# Patient Record
Sex: FEMALE | Race: WHITE | Employment: UNEMPLOYED | URBAN - METROPOLITAN AREA
[De-identification: names, ages, dates, MRNs, and addresses within clinical notes are randomized per-mention and may not be internally consistent; named-entity substitution may affect disease eponyms.]

---

## 2017-06-12 RX ORDER — NORETHINDRONE ACETATE AND ETHINYL ESTRADIOL 1MG-20(21)
KIT ORAL
Qty: 84 TABLET | Refills: 0 | Status: SHIPPED | OUTPATIENT
Start: 2017-06-12 | End: 2018-06-01

## 2017-06-12 NOTE — TELEPHONE ENCOUNTER
Called and spoke to pt regarding her [facility] refill. 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  Pt states she does not need refill right now, but will call our office when

## 2017-09-05 RX ORDER — NORETHINDRONE ACETATE AND ETHINYL ESTRADIOL 1MG-20(21)
KIT ORAL
Qty: 84 TABLET | Refills: 0 | OUTPATIENT
Start: 2017-09-05

## 2017-10-02 ENCOUNTER — OFFICE VISIT (OUTPATIENT)
Dept: INTERNAL MEDICINE CLINIC | Facility: CLINIC | Age: 22
End: 2017-10-02

## 2017-10-02 VITALS
DIASTOLIC BLOOD PRESSURE: 70 MMHG | TEMPERATURE: 98 F | BODY MASS INDEX: 28.9 KG/M2 | SYSTOLIC BLOOD PRESSURE: 100 MMHG | HEART RATE: 76 BPM | HEIGHT: 66 IN | RESPIRATION RATE: 12 BRPM | WEIGHT: 179.81 LBS

## 2017-10-02 DIAGNOSIS — Z00.00 PHYSICAL EXAM, ANNUAL: Primary | ICD-10-CM

## 2017-10-02 DIAGNOSIS — Z01.419 VISIT FOR PELVIC EXAM: ICD-10-CM

## 2017-10-02 DIAGNOSIS — N89.8 VAGINAL DISCHARGE: ICD-10-CM

## 2017-10-02 DIAGNOSIS — B00.1 RECURRENT COLD SORES: ICD-10-CM

## 2017-10-02 DIAGNOSIS — Z23 NEED FOR VACCINATION: ICD-10-CM

## 2017-10-02 PROCEDURE — 87480 CANDIDA DNA DIR PROBE: CPT | Performed by: INTERNAL MEDICINE

## 2017-10-02 PROCEDURE — 87510 GARDNER VAG DNA DIR PROBE: CPT | Performed by: INTERNAL MEDICINE

## 2017-10-02 PROCEDURE — 90714 TD VACC NO PRESV 7 YRS+ IM: CPT | Performed by: INTERNAL MEDICINE

## 2017-10-02 PROCEDURE — 88175 CYTOPATH C/V AUTO FLUID REDO: CPT | Performed by: INTERNAL MEDICINE

## 2017-10-02 PROCEDURE — 99395 PREV VISIT EST AGE 18-39: CPT | Performed by: INTERNAL MEDICINE

## 2017-10-02 PROCEDURE — 87660 TRICHOMONAS VAGIN DIR PROBE: CPT | Performed by: INTERNAL MEDICINE

## 2017-10-02 PROCEDURE — 90471 IMMUNIZATION ADMIN: CPT | Performed by: INTERNAL MEDICINE

## 2017-10-02 RX ORDER — VALACYCLOVIR HYDROCHLORIDE 500 MG/1
500 TABLET, FILM COATED ORAL
Qty: 30 TABLET | Refills: 0 | Status: SHIPPED | OUTPATIENT
Start: 2017-10-02 | End: 2018-02-01

## 2017-10-02 RX ORDER — LISDEXAMFETAMINE DIMESYLATE 70 MG/1
70 CAPSULE ORAL EVERY MORNING
COMMUNITY
Start: 2017-07-06 | End: 2018-01-09

## 2017-10-02 NOTE — PROGRESS NOTES
[facility]    CHIEF COMPLAINT: Patient presents with:  Routine Physical: 2/22/16 unable to evaluate pap due to scant cellularity. Pt requesting pap and pelvic.   Undecided regarding flu shot  Medication Request: refill valcyclovir        HPI:   D • Stomach disorder     \"stomach issues\"      Past Surgical History:  No date: OTHER SURGICAL HISTORY      Comment: ihsan surgery d/t blood pooling in bladder   Family History   Problem Relation Age of Onset   • Adopted:  Yes   • Family history unknown: mass, no nipple discharge  GENITAL/URINARY:  External Genitalia:  General appearance; normal, Hair distribution; normal, Lesions absent, Urethral Meatus:  Size normal, Location normal, Lesions absent, Prolapse absent, Vagina:  General appearance normal, Le for vaccination  - TETANUS AND DIPHTHERIA, PRESERVE FREE    The patient is asked to return in 1 year for cpx.     Shanell Rodriguez MD

## 2017-10-03 ENCOUNTER — APPOINTMENT (OUTPATIENT)
Dept: LAB | Age: 22
End: 2017-10-03
Attending: INTERNAL MEDICINE
Payer: COMMERCIAL

## 2018-02-01 DIAGNOSIS — B00.1 RECURRENT COLD SORES: ICD-10-CM

## 2018-02-01 RX ORDER — VALACYCLOVIR HYDROCHLORIDE 500 MG/1
TABLET, FILM COATED ORAL
Qty: 30 TABLET | Refills: 5 | Status: SHIPPED | OUTPATIENT
Start: 2018-02-01 | End: 2018-02-06

## 2018-02-06 DIAGNOSIS — B00.1 RECURRENT COLD SORES: ICD-10-CM

## 2018-02-06 RX ORDER — VALACYCLOVIR HYDROCHLORIDE 500 MG/1
500 TABLET, FILM COATED ORAL
Qty: 90 TABLET | Refills: 1 | Status: SHIPPED | OUTPATIENT
Start: 2018-02-06 | End: 2019-01-28

## 2018-02-06 NOTE — TELEPHONE ENCOUNTER
Received refill request from [facility] for Valcyclovir with note stating pharmacy change. in reason for call., Rx was filled on 2/1/18 at [place]. #30, 5 RF. Called [facility] to cx refill. Sent refill request, ok per protocol, to [facility].

## 2018-05-31 ENCOUNTER — PATIENT MESSAGE (OUTPATIENT)
Dept: INTERNAL MEDICINE CLINIC | Facility: CLINIC | Age: 23
End: 2018-05-31

## 2018-05-31 RX ORDER — NORETHINDRONE ACETATE AND ETHINYL ESTRADIOL 1MG-20(21)
KIT ORAL
Qty: 84 TABLET | Refills: 0 | OUTPATIENT
Start: 2018-05-31

## 2018-05-31 RX ORDER — NORETHINDRONE ACETATE AND ETHINYL ESTRADIOL 1MG-20(21)
KIT ORAL
Qty: 84 TABLET | Refills: 0 | Status: CANCELLED
Start: 2018-05-31

## 2018-05-31 NOTE — TELEPHONE ENCOUNTER
From: Eloise Graft  Sent: 5/31/2018 11:07 AM CDT  Subject: Medication Renewal Request    Robby Nolasco would like a refill of the following medications:     BLISOVI FE 1/20 1-20 [facility] Oral Tab Trey Rosa MD]   Patient Comment: I had my mo

## 2018-06-01 NOTE — TELEPHONE ENCOUNTER
Last OV relevant to medication: 10/2/17  Last refill date: 6/12/17     #/refills: 84+0  When pt was asked to return for OV: 1 yr  Upcoming appt/reason: none

## 2018-06-01 NOTE — TELEPHONE ENCOUNTER
From: Sultana Pulido  To: Manohar Pimentel MD  Sent: 5/31/2018 12:27 PM CDT  Subject: Prescription Question    I have requested a new perscription for my birth control and it was denied. I do not understand.  My last Pap smear was in October, 2017 at y

## 2018-06-01 NOTE — TELEPHONE ENCOUNTER
This was last refilled in 6/2017 for 3 months. Should have run out of it months ago. Has she been taking this regularly? How has she not needed a refill before this?

## 2018-06-08 RX ORDER — NORETHINDRONE ACETATE AND ETHINYL ESTRADIOL 1MG-20(21)
KIT ORAL
Qty: 84 TABLET | Refills: 1 | Status: SHIPPED | OUTPATIENT
Start: 2018-06-08 | End: 2018-06-13 | Stop reason: ALTCHOICE

## 2018-06-08 NOTE — TELEPHONE ENCOUNTER
Spoke to pt, she was not taking regularly because her periods were irregular and making her sick. She has noticed they are better and wants to get on the pill again. [facility] and wont be home till Dec because of work and school.   She is on

## 2018-06-08 NOTE — TELEPHONE ENCOUNTER
Noted below. Pt currently on her menses. Can start with the ocp on Sunday. rx done. Please inform pt.

## 2018-06-12 ENCOUNTER — TELEPHONE (OUTPATIENT)
Dept: INTERNAL MEDICINE CLINIC | Facility: CLINIC | Age: 23
End: 2018-06-12

## 2018-06-12 NOTE — TELEPHONE ENCOUNTER
Patient called stating we sent over the wrong OC to her pharmacy on 06/08/18. She does not want the Blisovi Fe 1/20, she wants the Gildess Fe 1/20. Is this okay? Medication pended.

## 2018-06-12 NOTE — TELEPHONE ENCOUNTER
Pt states that the incorrect birth control was called in for her, should have been Gilmarian. To [address]. Pt already picked up incorrect pack, not sure how this works with insurance. Please advise pt when this is complete.  Pt would like to

## 2018-06-13 RX ORDER — NORETHINDRONE ACETATE AND ETHINYL ESTRADIOL 1MG-20(21)
1 KIT ORAL DAILY
Qty: 28 TABLET | Refills: 0 | Status: SHIPPED | OUTPATIENT
Start: 2018-06-13 | End: 2018-09-12

## 2018-06-13 RX ORDER — NORETHINDRONE ACETATE AND ETHINYL ESTRADIOL 1MG-20(21)
1 KIT ORAL DAILY
Qty: 84 TABLET | Refills: 0 | Status: SHIPPED | OUTPATIENT
Start: 2018-06-13 | End: 2018-09-05

## 2018-06-13 NOTE — TELEPHONE ENCOUNTER
Patients mom Sam Ashton also called, relayed below information, patients mom voiced understanding. Asked if we can send the correct medication to the patients local pharmacy but then also send a year supply to the [facility] home delivery.  Please advise, t

## 2018-06-13 NOTE — TELEPHONE ENCOUNTER
Okay to order as below, however cannot do a years supply since she will be due for a cpx in October. We can do a 6 month supply for now.

## 2018-06-13 NOTE — TELEPHONE ENCOUNTER
Spoke to the patient's mother, Rosette Grimm, who is on HIPAA ([phone]). Informed her that Dr. Hortensia Mcintosh can order a 6 month prescription of the Gildess FE, but can not do a year supply, because the patient is due for a CPX in October of this year (2018).  Rossy Fails

## 2018-09-05 NOTE — TELEPHONE ENCOUNTER
Last OV relevant to medication: 10/2/07  Last refill date:6/13/18    #84/refills:0  When pt was asked to return for OV: 1 year  Upcoming appt/reason: No appt scheduled. 10/2/17 normal pap  Spoke with pt.   She will call back to schedule physical and will

## 2018-09-12 RX ORDER — NORETHINDRONE ACETATE AND ETHINYL ESTRADIOL 1MG-20(21)
KIT ORAL
Qty: 28 TABLET | Refills: 0 | Status: SHIPPED | OUTPATIENT
Start: 2018-09-12 | End: 2018-12-14

## 2018-09-12 NOTE — TELEPHONE ENCOUNTER
Not sure why this was routed to me. Looks like we are waiting for her to call us back with a local pharmacy.

## 2018-10-19 ENCOUNTER — OFFICE VISIT (OUTPATIENT)
Dept: OBGYN CLINIC | Facility: CLINIC | Age: 23
End: 2018-10-19
Payer: COMMERCIAL

## 2018-10-19 VITALS
TEMPERATURE: 98 F | DIASTOLIC BLOOD PRESSURE: 72 MMHG | RESPIRATION RATE: 12 BRPM | HEART RATE: 68 BPM | HEIGHT: 66 IN | BODY MASS INDEX: 27.64 KG/M2 | SYSTOLIC BLOOD PRESSURE: 122 MMHG | WEIGHT: 172 LBS

## 2018-10-19 DIAGNOSIS — R10.2 PELVIC PAIN: ICD-10-CM

## 2018-10-19 DIAGNOSIS — E28.2 PCOS (POLYCYSTIC OVARIAN SYNDROME): ICD-10-CM

## 2018-10-19 DIAGNOSIS — Z23 NEED FOR VACCINATION: ICD-10-CM

## 2018-10-19 DIAGNOSIS — Z11.3 SCREENING FOR STD (SEXUALLY TRANSMITTED DISEASE): Primary | ICD-10-CM

## 2018-10-19 DIAGNOSIS — Z30.011 OCP (ORAL CONTRACEPTIVE PILLS) INITIATION: ICD-10-CM

## 2018-10-19 PROCEDURE — 87591 N.GONORRHOEAE DNA AMP PROB: CPT | Performed by: OBSTETRICS & GYNECOLOGY

## 2018-10-19 PROCEDURE — 90471 IMMUNIZATION ADMIN: CPT | Performed by: OBSTETRICS & GYNECOLOGY

## 2018-10-19 PROCEDURE — 90686 IIV4 VACC NO PRSV 0.5 ML IM: CPT | Performed by: OBSTETRICS & GYNECOLOGY

## 2018-10-19 PROCEDURE — 87491 CHLMYD TRACH DNA AMP PROBE: CPT | Performed by: OBSTETRICS & GYNECOLOGY

## 2018-10-19 PROCEDURE — 99203 OFFICE O/P NEW LOW 30 MIN: CPT | Performed by: OBSTETRICS & GYNECOLOGY

## 2018-10-19 NOTE — PROGRESS NOTES
GYN H&P     10/19/2018  2:57 PM    CC: Patient is here with chief complaint of having pain in the left adnexal area for the past 7 months. Her menses have been about 2-4 months apart. She stopped taking birth control pills about a year ago.   Her last men of ovarie.  Possibility of PCOS   • Irregular menses 11/2008   • Knee pain     x 5 years   • Learning disability    • Mood disorder ([facility]    • Oligomenorrhea    • Pap smear for cervical cancer screening 08/26/2015    negative   • PCOS (polycystic ovarian sy Seat Belt: Not Asked        Self-Exams: Not Asked    Social History Narrative      Not on file         /72 (BP Location: Right arm, Patient Position: Sitting, Cuff Size: adult)   Pulse 68   Temp 98.4 °F (36.9 °C) (Oral)   Resp 12   Ht 66\"   Wt 172

## 2018-10-22 ENCOUNTER — APPOINTMENT (OUTPATIENT)
Dept: OBGYN CLINIC | Facility: CLINIC | Age: 23
End: 2018-10-22
Payer: COMMERCIAL

## 2018-10-24 RX ORDER — NORGESTIMATE AND ETHINYL ESTRADIOL 7DAYSX3 28
1 KIT ORAL DAILY
Qty: 3 PACKAGE | Refills: 1 | Status: SHIPPED | OUTPATIENT
Start: 2018-10-24 | End: 2018-11-21

## 2018-12-14 ENCOUNTER — APPOINTMENT (OUTPATIENT)
Dept: LAB | Age: 23
End: 2018-12-14
Attending: INTERNAL MEDICINE
Payer: COMMERCIAL

## 2018-12-14 ENCOUNTER — OFFICE VISIT (OUTPATIENT)
Dept: INTERNAL MEDICINE CLINIC | Facility: CLINIC | Age: 23
End: 2018-12-14
Payer: COMMERCIAL

## 2018-12-14 VITALS
DIASTOLIC BLOOD PRESSURE: 62 MMHG | HEIGHT: 67 IN | TEMPERATURE: 98 F | HEART RATE: 72 BPM | BODY MASS INDEX: 27.45 KG/M2 | RESPIRATION RATE: 12 BRPM | WEIGHT: 174.88 LBS | SYSTOLIC BLOOD PRESSURE: 106 MMHG

## 2018-12-14 DIAGNOSIS — H92.03 EAR PAIN, BILATERAL: ICD-10-CM

## 2018-12-14 DIAGNOSIS — N76.0 ACUTE VAGINITIS: Primary | ICD-10-CM

## 2018-12-14 PROCEDURE — 86780 TREPONEMA PALLIDUM: CPT | Performed by: INTERNAL MEDICINE

## 2018-12-14 PROCEDURE — 86592 SYPHILIS TEST NON-TREP QUAL: CPT | Performed by: OBSTETRICS & GYNECOLOGY

## 2018-12-14 PROCEDURE — 87491 CHLMYD TRACH DNA AMP PROBE: CPT | Performed by: INTERNAL MEDICINE

## 2018-12-14 PROCEDURE — 87340 HEPATITIS B SURFACE AG IA: CPT | Performed by: OBSTETRICS & GYNECOLOGY

## 2018-12-14 PROCEDURE — 99213 OFFICE O/P EST LOW 20 MIN: CPT | Performed by: INTERNAL MEDICINE

## 2018-12-14 PROCEDURE — 87510 GARDNER VAG DNA DIR PROBE: CPT | Performed by: INTERNAL MEDICINE

## 2018-12-14 PROCEDURE — 86704 HEP B CORE ANTIBODY TOTAL: CPT | Performed by: INTERNAL MEDICINE

## 2018-12-14 PROCEDURE — 36415 COLL VENOUS BLD VENIPUNCTURE: CPT | Performed by: OBSTETRICS & GYNECOLOGY

## 2018-12-14 PROCEDURE — 87389 HIV-1 AG W/HIV-1&-2 AB AG IA: CPT | Performed by: OBSTETRICS & GYNECOLOGY

## 2018-12-14 PROCEDURE — 87660 TRICHOMONAS VAGIN DIR PROBE: CPT | Performed by: INTERNAL MEDICINE

## 2018-12-14 PROCEDURE — 87591 N.GONORRHOEAE DNA AMP PROB: CPT | Performed by: INTERNAL MEDICINE

## 2018-12-14 PROCEDURE — 86706 HEP B SURFACE ANTIBODY: CPT | Performed by: INTERNAL MEDICINE

## 2018-12-14 PROCEDURE — 87480 CANDIDA DNA DIR PROBE: CPT | Performed by: INTERNAL MEDICINE

## 2018-12-14 PROCEDURE — 86803 HEPATITIS C AB TEST: CPT | Performed by: OBSTETRICS & GYNECOLOGY

## 2018-12-14 RX ORDER — NORGESTIMATE AND ETHINYL ESTRADIOL 7DAYSX3 28
KIT ORAL DAILY
COMMUNITY
End: 2019-04-11

## 2018-12-14 NOTE — PROGRESS NOTES
[facility]    CHIEF COMPLAINT:  Patient presents with:  Vaginal Discharge: vaginal discharge and itching. Discharge has odor. Sx for one week. Has been using Wetzel Rom. Denes Urinary sx.         HISTORY OF PRESENT ILLNESS:  Complains of serous fluid behind them. : yellow watery discharge present in vaginal vault. Cultures done. Cervix appears normal, no cervical motion tenderness.      DATA:  Results for orders placed or performed in visit on 10/19/18   CHLAMYDIA/GONOCOCCUS, CAORLE   Resu

## 2018-12-17 RX ORDER — AZITHROMYCIN 500 MG/1
TABLET, FILM COATED ORAL
Qty: 2 TABLET | Refills: 0 | Status: SHIPPED | OUTPATIENT
Start: 2018-12-17 | End: 2019-01-07 | Stop reason: ALTCHOICE

## 2018-12-18 ENCOUNTER — TELEPHONE (OUTPATIENT)
Dept: INTERNAL MEDICINE CLINIC | Facility: CLINIC | Age: 23
End: 2018-12-18

## 2018-12-18 NOTE — TELEPHONE ENCOUNTER
Fax received from [facility]] regarding recent dx of chlamydia. Form completed, faxed back to health dept, sent to scan.

## 2018-12-18 NOTE — TELEPHONE ENCOUNTER
Incoming (mail or fax):  fax  Received from:  [facility] Dept  Documentation given to:  triage

## 2018-12-27 ENCOUNTER — NURSE ONLY (OUTPATIENT)
Dept: INTERNAL MEDICINE CLINIC | Facility: CLINIC | Age: 23
End: 2018-12-27
Payer: COMMERCIAL

## 2018-12-27 PROCEDURE — 90471 IMMUNIZATION ADMIN: CPT | Performed by: INTERNAL MEDICINE

## 2018-12-27 PROCEDURE — 90746 HEPB VACCINE 3 DOSE ADULT IM: CPT | Performed by: INTERNAL MEDICINE

## 2019-01-07 ENCOUNTER — OFFICE VISIT (OUTPATIENT)
Dept: OBGYN CLINIC | Facility: CLINIC | Age: 24
End: 2019-01-07
Payer: COMMERCIAL

## 2019-01-07 VITALS
RESPIRATION RATE: 14 BRPM | BODY MASS INDEX: 28.56 KG/M2 | SYSTOLIC BLOOD PRESSURE: 120 MMHG | WEIGHT: 182 LBS | HEART RATE: 84 BPM | HEIGHT: 67 IN | DIASTOLIC BLOOD PRESSURE: 80 MMHG

## 2019-01-07 DIAGNOSIS — Z11.3 SCREEN FOR STD (SEXUALLY TRANSMITTED DISEASE): Primary | ICD-10-CM

## 2019-01-07 DIAGNOSIS — N89.8 VAGINAL DISCHARGE: ICD-10-CM

## 2019-01-07 PROCEDURE — 87591 N.GONORRHOEAE DNA AMP PROB: CPT | Performed by: OBSTETRICS & GYNECOLOGY

## 2019-01-07 PROCEDURE — 87491 CHLMYD TRACH DNA AMP PROBE: CPT | Performed by: OBSTETRICS & GYNECOLOGY

## 2019-01-07 PROCEDURE — 87480 CANDIDA DNA DIR PROBE: CPT | Performed by: OBSTETRICS & GYNECOLOGY

## 2019-01-07 PROCEDURE — 87660 TRICHOMONAS VAGIN DIR PROBE: CPT | Performed by: OBSTETRICS & GYNECOLOGY

## 2019-01-07 PROCEDURE — 87510 GARDNER VAG DNA DIR PROBE: CPT | Performed by: OBSTETRICS & GYNECOLOGY

## 2019-01-07 PROCEDURE — 99213 OFFICE O/P EST LOW 20 MIN: CPT | Performed by: OBSTETRICS & GYNECOLOGY

## 2019-01-07 NOTE — PROGRESS NOTES
CHIEF COMPLAINT:   Patient presents with vaginal discharge  HPI:   Ondina Herbert is a 20year old  Patient's last menstrual period was 2019 (exact date). who presents with vaginal discharge.   She started her menses and is bleeding lightl CAROLE  - VAGINITIS/VAGINOSIS, DNA PROBE    STD screen: Was positive for chlamydia in December 2018    John Mendoza MD

## 2019-01-07 NOTE — PROGRESS NOTES
Since last week yellow discharge with slight odor. Recently treated for chlamydia. Has not had reculture. Menses started today.

## 2019-01-08 LAB
C TRACH DNA SPEC QL NAA+PROBE: NEGATIVE
N GONORRHOEA DNA SPEC QL NAA+PROBE: NEGATIVE

## 2019-01-14 ENCOUNTER — TELEPHONE (OUTPATIENT)
Dept: OBGYN CLINIC | Facility: CLINIC | Age: 24
End: 2019-01-14

## 2019-01-21 ENCOUNTER — OFFICE VISIT (OUTPATIENT)
Dept: OBGYN CLINIC | Facility: CLINIC | Age: 24
End: 2019-01-21
Payer: COMMERCIAL

## 2019-01-21 VITALS
HEIGHT: 66 IN | SYSTOLIC BLOOD PRESSURE: 116 MMHG | WEIGHT: 175 LBS | RESPIRATION RATE: 12 BRPM | TEMPERATURE: 99 F | BODY MASS INDEX: 28.13 KG/M2 | HEART RATE: 68 BPM | DIASTOLIC BLOOD PRESSURE: 74 MMHG

## 2019-01-21 DIAGNOSIS — N94.9 VAGINAL BURNING: ICD-10-CM

## 2019-01-21 DIAGNOSIS — Z86.19 HX OF CHLAMYDIA INFECTION: ICD-10-CM

## 2019-01-21 DIAGNOSIS — N89.8 VAGINAL DISCHARGE: Primary | ICD-10-CM

## 2019-01-21 PROCEDURE — 87660 TRICHOMONAS VAGIN DIR PROBE: CPT | Performed by: OBSTETRICS & GYNECOLOGY

## 2019-01-21 PROCEDURE — 87491 CHLMYD TRACH DNA AMP PROBE: CPT | Performed by: OBSTETRICS & GYNECOLOGY

## 2019-01-21 PROCEDURE — 87480 CANDIDA DNA DIR PROBE: CPT | Performed by: OBSTETRICS & GYNECOLOGY

## 2019-01-21 PROCEDURE — 87510 GARDNER VAG DNA DIR PROBE: CPT | Performed by: OBSTETRICS & GYNECOLOGY

## 2019-01-21 PROCEDURE — 99213 OFFICE O/P EST LOW 20 MIN: CPT | Performed by: OBSTETRICS & GYNECOLOGY

## 2019-01-21 PROCEDURE — 87591 N.GONORRHOEAE DNA AMP PROB: CPT | Performed by: OBSTETRICS & GYNECOLOGY

## 2019-01-21 NOTE — PROGRESS NOTES
CHIEF COMPLAINT:   Patient presents with vaginal burning and discharge  HPI:   Eloise Graft is a 20year old  Patient's last menstrual period was 2019 (exact date). who presents with above for the past 2 weeks.   She had chlamydia in Dec discharge  uterus      WNL size, NT, mobile,   adnexa     no masses, NT    IMPRESSION/PLAN:     1. Vaginal discharge    - VAGINITIS/VAGINOSIS, DNA PROBE    2. Hx of chlamydia infection    - CHLAMYDIA/GONOCOCCUS, CAROLE    3.  Vaginal burning  Patient was advis

## 2019-01-21 NOTE — PROGRESS NOTES
Pt c/o vaginal discharge. Was dx'd with chlamydia  in December . Still experiencing discharge. Some burning during urination.

## 2019-01-22 LAB
C TRACH DNA SPEC QL NAA+PROBE: NEGATIVE
N GONORRHOEA DNA SPEC QL NAA+PROBE: NEGATIVE

## 2019-01-28 ENCOUNTER — TELEPHONE (OUTPATIENT)
Dept: INTERNAL MEDICINE CLINIC | Facility: CLINIC | Age: 24
End: 2019-01-28

## 2019-01-28 DIAGNOSIS — B00.1 RECURRENT COLD SORES: ICD-10-CM

## 2019-01-28 DIAGNOSIS — Z23 NEED FOR HEPATITIS B VACCINATION: Primary | ICD-10-CM

## 2019-01-29 RX ORDER — VALACYCLOVIR HYDROCHLORIDE 500 MG/1
500 TABLET, FILM COATED ORAL
Qty: 90 TABLET | Refills: 1 | Status: SHIPPED | OUTPATIENT
Start: 2019-01-29 | End: 2019-02-07

## 2019-02-04 ENCOUNTER — TELEPHONE (OUTPATIENT)
Dept: INTERNAL MEDICINE CLINIC | Facility: CLINIC | Age: 24
End: 2019-02-04

## 2019-02-04 ENCOUNTER — NURSE ONLY (OUTPATIENT)
Dept: INTERNAL MEDICINE CLINIC | Facility: CLINIC | Age: 24
End: 2019-02-04
Payer: COMMERCIAL

## 2019-02-04 PROCEDURE — 90746 HEPB VACCINE 3 DOSE ADULT IM: CPT | Performed by: INTERNAL MEDICINE

## 2019-02-04 PROCEDURE — 90471 IMMUNIZATION ADMIN: CPT | Performed by: INTERNAL MEDICINE

## 2019-02-04 NOTE — TELEPHONE ENCOUNTER
Patient coming in today for second Hep B vaccination. Order pended, please sign and route back. Thank you. Routed to Dr. Marry Roper.

## 2019-02-04 NOTE — PROGRESS NOTES
Name and  verified, VIS given. Pt here for 2nd Hep B injection, admin IM left deltoid, no complications/complaints. To return end of 2019 for 3rd dose.

## 2019-02-05 NOTE — TELEPHONE ENCOUNTER
Pt due for 3rd Hep B approx 6/27/18, pended, needs signature by provider. Do not need to route back, thanks.   HEP B 2/4/2019, 12/27/2018

## 2019-02-07 ENCOUNTER — OFFICE VISIT (OUTPATIENT)
Dept: INTERNAL MEDICINE CLINIC | Facility: CLINIC | Age: 24
End: 2019-02-07
Payer: COMMERCIAL

## 2019-02-07 VITALS
OXYGEN SATURATION: 99 % | HEIGHT: 66 IN | HEART RATE: 88 BPM | WEIGHT: 187 LBS | RESPIRATION RATE: 16 BRPM | SYSTOLIC BLOOD PRESSURE: 90 MMHG | BODY MASS INDEX: 30.05 KG/M2 | TEMPERATURE: 99 F | DIASTOLIC BLOOD PRESSURE: 60 MMHG

## 2019-02-07 DIAGNOSIS — B00.1 RECURRENT COLD SORES: ICD-10-CM

## 2019-02-07 DIAGNOSIS — J06.9 UPPER RESPIRATORY TRACT INFECTION, UNSPECIFIED TYPE: Primary | ICD-10-CM

## 2019-02-07 PROCEDURE — 99213 OFFICE O/P EST LOW 20 MIN: CPT | Performed by: INTERNAL MEDICINE

## 2019-02-07 RX ORDER — BENZONATATE 200 MG/1
200 CAPSULE ORAL 2 TIMES DAILY PRN
Qty: 30 CAPSULE | Refills: 0 | Status: SHIPPED | OUTPATIENT
Start: 2019-02-07 | End: 2019-04-15 | Stop reason: ALTCHOICE

## 2019-02-07 RX ORDER — CODEINE PHOSPHATE AND GUAIFENESIN 10; 100 MG/5ML; MG/5ML
SOLUTION ORAL NIGHTLY PRN
Qty: 180 ML | Refills: 0 | Status: SHIPPED | OUTPATIENT
Start: 2019-02-07 | End: 2019-04-15 | Stop reason: ALTCHOICE

## 2019-02-07 RX ORDER — VALACYCLOVIR HYDROCHLORIDE 500 MG/1
500 TABLET, FILM COATED ORAL
Qty: 90 TABLET | Refills: 1 | Status: SHIPPED | OUTPATIENT
Start: 2019-02-07 | End: 2019-07-23

## 2019-02-07 RX ORDER — AZITHROMYCIN 250 MG/1
TABLET, FILM COATED ORAL
Qty: 6 TABLET | Refills: 0 | Status: SHIPPED | OUTPATIENT
Start: 2019-02-07 | End: 2019-04-15 | Stop reason: ALTCHOICE

## 2019-02-07 NOTE — PROGRESS NOTES
[facility]    CHIEF COMPLAINT:  Patient presents with:  Cough: with production, chest tightness and wheezing. Sx started 3 days ago.   Took Nyquil, cough drops, and alleve cold sinus  Sinus Problem: runny nose  Body ache and/or chills: didn't jim SpO2 99%   BMI 30.18 kg/m²    GENERAL: well developed, well nourished,in no apparent distress  HEENT: oropharynx with post nasal drip, erythematous, no tonsillar enlargement or exudates.    NECK: no lad  LUNGS: clear to auscultation  CARDIO: RRR without mur

## 2019-02-08 DIAGNOSIS — B00.1 RECURRENT COLD SORES: ICD-10-CM

## 2019-02-11 RX ORDER — VALACYCLOVIR HYDROCHLORIDE 500 MG/1
500 TABLET, FILM COATED ORAL
Qty: 90 TABLET | Refills: 1 | OUTPATIENT
Start: 2019-02-11

## 2019-04-11 ENCOUNTER — TELEPHONE (OUTPATIENT)
Dept: OBGYN CLINIC | Facility: CLINIC | Age: 24
End: 2019-04-11

## 2019-04-11 RX ORDER — NORGESTIMATE AND ETHINYL ESTRADIOL 7DAYSX3 28
1 KIT ORAL DAILY
Qty: 84 TABLET | Refills: 0 | Status: SHIPPED | OUTPATIENT
Start: 2019-04-11 | End: 2019-05-06

## 2019-04-11 NOTE — TELEPHONE ENCOUNTER
Patient has changed pharmacy's to   [facility]   [address] [address]   [facility] [address]  [facility] [phone]    Member #  [id number]    Patient needs her birth control refilled  Diana Do

## 2019-04-22 RX ORDER — NORGESTIMATE AND ETHINYL ESTRADIOL 7DAYSX3 28
1 KIT ORAL DAILY
Qty: 84 TABLET | Refills: 0 | OUTPATIENT
Start: 2019-04-22

## 2019-04-22 NOTE — TELEPHONE ENCOUNTER
PC with patient. Received request for refill of ocp. Overdue for annual exam with provider. Scheduled for 5/6/2019. A refill for 84 days was already sent on 4/11/2019.  Spoke with [facility] and they have the refill but is not due to be processed until 5/2/

## 2019-05-06 ENCOUNTER — OFFICE VISIT (OUTPATIENT)
Dept: OBGYN CLINIC | Facility: CLINIC | Age: 24
End: 2019-05-06
Payer: COMMERCIAL

## 2019-05-06 VITALS
HEART RATE: 72 BPM | HEIGHT: 66 IN | DIASTOLIC BLOOD PRESSURE: 70 MMHG | TEMPERATURE: 99 F | WEIGHT: 195 LBS | BODY MASS INDEX: 31.34 KG/M2 | RESPIRATION RATE: 14 BRPM | SYSTOLIC BLOOD PRESSURE: 102 MMHG

## 2019-05-06 DIAGNOSIS — Z12.4 SCREENING FOR MALIGNANT NEOPLASM OF THE CERVIX: ICD-10-CM

## 2019-05-06 DIAGNOSIS — Z01.419 ENCOUNTER FOR ANNUAL ROUTINE GYNECOLOGICAL EXAMINATION: Primary | ICD-10-CM

## 2019-05-06 DIAGNOSIS — Z11.3 SCREEN FOR STD (SEXUALLY TRANSMITTED DISEASE): ICD-10-CM

## 2019-05-06 PROCEDURE — 88175 CYTOPATH C/V AUTO FLUID REDO: CPT | Performed by: OBSTETRICS & GYNECOLOGY

## 2019-05-06 PROCEDURE — 87491 CHLMYD TRACH DNA AMP PROBE: CPT | Performed by: OBSTETRICS & GYNECOLOGY

## 2019-05-06 PROCEDURE — 87591 N.GONORRHOEAE DNA AMP PROB: CPT | Performed by: OBSTETRICS & GYNECOLOGY

## 2019-05-06 PROCEDURE — 99395 PREV VISIT EST AGE 18-39: CPT | Performed by: OBSTETRICS & GYNECOLOGY

## 2019-05-06 RX ORDER — NORGESTIMATE AND ETHINYL ESTRADIOL 7DAYSX3 28
1 KIT ORAL DAILY
Qty: 3 PACKAGE | Refills: 3 | Status: SHIPPED | OUTPATIENT
Start: 2019-05-06 | End: 2021-01-25

## 2019-05-06 NOTE — PROGRESS NOTES
HPI:   Aislinn Chambers is a 20year old  who presents for an annual gynecological exam.    Menses: No LMP recorded (lmp unknown). Stop the pills about a month ago due to running out. She has been using condoms. Her boyfriend is in [place].   She wo month, pt reports smoking very little now but did smoke regularly age 11       REVIEW OF SYSTEMS:   CONSTITUTIONAL: generally feels well   SKIN: denies any unusual skin lesions, rash, itchiness  HEENT: denies nasal congestion, sinus pain or sore throat; de Patient oriented to time, place and person; mood and affect appropriate    DISCUSSED:  · I encouraged incorporating 4x weekly cardiovascular activity to maintain good physical health.   · We discussed maintaining a healthy diet heavy in fruits and vegetable

## 2019-05-06 NOTE — PROGRESS NOTES
Pt here for pe/pap.   LMP: Unknown  Last pap:10/2/17 negative  Last mammogram: N/A  Birth control: OCP's

## 2019-07-08 ENCOUNTER — OFFICE VISIT (OUTPATIENT)
Dept: INTERNAL MEDICINE CLINIC | Facility: CLINIC | Age: 24
End: 2019-07-08
Payer: COMMERCIAL

## 2019-07-08 VITALS
BODY MASS INDEX: 31.96 KG/M2 | HEART RATE: 88 BPM | DIASTOLIC BLOOD PRESSURE: 70 MMHG | TEMPERATURE: 99 F | RESPIRATION RATE: 12 BRPM | SYSTOLIC BLOOD PRESSURE: 120 MMHG | HEIGHT: 66 IN | WEIGHT: 198.88 LBS

## 2019-07-08 DIAGNOSIS — J06.9 VIRAL UPPER RESPIRATORY TRACT INFECTION: ICD-10-CM

## 2019-07-08 DIAGNOSIS — J02.9 SORE THROAT: Primary | ICD-10-CM

## 2019-07-08 LAB
CONTROL LINE PRESENT WITH A CLEAR BACKGROUND (YES/NO): YES YES/NO
STREP GRP A CUL-SCR: NEGATIVE

## 2019-07-08 PROCEDURE — 99213 OFFICE O/P EST LOW 20 MIN: CPT | Performed by: INTERNAL MEDICINE

## 2019-07-08 PROCEDURE — 87880 STREP A ASSAY W/OPTIC: CPT | Performed by: INTERNAL MEDICINE

## 2019-07-08 PROCEDURE — 87081 CULTURE SCREEN ONLY: CPT | Performed by: INTERNAL MEDICINE

## 2019-07-08 RX ORDER — KETOCONAZOLE 20 MG/ML
SHAMPOO TOPICAL
Refills: 0 | COMMUNITY
Start: 2019-05-20 | End: 2021-01-25

## 2019-07-08 RX ORDER — CLOBETASOL PROPIONATE 0.46 MG/ML
SOLUTION TOPICAL
Refills: 0 | COMMUNITY
Start: 2019-05-20 | End: 2019-07-08

## 2019-07-08 RX ORDER — AZITHROMYCIN 250 MG/1
TABLET, FILM COATED ORAL
Qty: 6 TABLET | Refills: 0 | Status: SHIPPED | OUTPATIENT
Start: 2019-07-08 | End: 2019-07-29

## 2019-07-08 RX ORDER — DEXTROAMPHETAMINE SACCHARATE, AMPHETAMINE ASPARTATE, DEXTROAMPHETAMINE SULFATE AND AMPHETAMINE SULFATE 5; 5; 5; 5 MG/1; MG/1; MG/1; MG/1
20 TABLET ORAL DAILY
Refills: 0 | COMMUNITY
Start: 2019-05-13 | End: 2020-10-12

## 2019-07-08 NOTE — PROGRESS NOTES
[facility]    CHIEF COMPLAINT:  Patient presents with:  Sore Throat: sx 4 days. Hurts to swallow. 5/6/19-pap. Headache: 2 1/2 days.    Neck Pain: yesterday  Nausea: yesterday        HISTORY OF PRESENT ILLNESS:  Complains of sore throat for 4 days 06/03/2019 (Approximate)   BMI 32.10 kg/m²    GENERAL: well developed, well nourished,in no apparent distress  HEENT: oropharynx with erythema, tonsils not enlarged, no exudates. NECK: has some anterior cervical lad.  Tenderness to palpation of neck chapo

## 2019-07-23 DIAGNOSIS — B00.1 RECURRENT COLD SORES: ICD-10-CM

## 2019-07-24 RX ORDER — VALACYCLOVIR HYDROCHLORIDE 500 MG/1
500 TABLET, FILM COATED ORAL
Qty: 90 TABLET | Refills: 0 | Status: SHIPPED | OUTPATIENT
Start: 2019-07-24 | End: 2021-12-13

## 2019-08-05 ENCOUNTER — TELEPHONE (OUTPATIENT)
Dept: INTERNAL MEDICINE CLINIC | Facility: CLINIC | Age: 24
End: 2019-08-05

## 2019-08-05 ENCOUNTER — NURSE ONLY (OUTPATIENT)
Dept: INTERNAL MEDICINE CLINIC | Facility: CLINIC | Age: 24
End: 2019-08-05
Payer: COMMERCIAL

## 2019-08-05 DIAGNOSIS — Z23 NEED FOR HEPATITIS B VACCINATION: ICD-10-CM

## 2019-08-05 PROCEDURE — 90471 IMMUNIZATION ADMIN: CPT | Performed by: INTERNAL MEDICINE

## 2019-08-05 PROCEDURE — 90746 HEPB VACCINE 3 DOSE ADULT IM: CPT | Performed by: INTERNAL MEDICINE

## 2019-08-05 NOTE — PROGRESS NOTES
Pt here for 3rd hep B vaccine. Name and  verified. VIS given to pt. Admin IM left deltoid, no complications/no complaints.

## 2019-08-05 NOTE — TELEPHONE ENCOUNTER
Pt in office today for 3rd/final Hep B vaccine. Was asking if she is caught up on her vaccines at this time. Assuming no medical need arises, appears vaccines are current at this time. Both Hep B and HPV series complete. Td last 10/2/17. Any I am missing?

## 2021-01-25 ENCOUNTER — OFFICE VISIT (OUTPATIENT)
Dept: OBGYN CLINIC | Facility: CLINIC | Age: 26
End: 2021-01-25
Payer: COMMERCIAL

## 2021-01-25 VITALS
DIASTOLIC BLOOD PRESSURE: 68 MMHG | HEIGHT: 66 IN | BODY MASS INDEX: 32.47 KG/M2 | SYSTOLIC BLOOD PRESSURE: 120 MMHG | WEIGHT: 202 LBS | TEMPERATURE: 99 F | RESPIRATION RATE: 10 BRPM | HEART RATE: 72 BPM

## 2021-01-25 DIAGNOSIS — Z30.011 OCP (ORAL CONTRACEPTIVE PILLS) INITIATION: ICD-10-CM

## 2021-01-25 DIAGNOSIS — Z12.4 SCREENING FOR MALIGNANT NEOPLASM OF CERVIX: ICD-10-CM

## 2021-01-25 DIAGNOSIS — L68.0 HIRSUTISM: ICD-10-CM

## 2021-01-25 DIAGNOSIS — E28.2 PCOS (POLYCYSTIC OVARIAN SYNDROME): ICD-10-CM

## 2021-01-25 DIAGNOSIS — N91.4 SECONDARY OLIGOMENORRHEA: ICD-10-CM

## 2021-01-25 DIAGNOSIS — R63.5 WEIGHT GAIN: ICD-10-CM

## 2021-01-25 DIAGNOSIS — R10.32 LLQ PAIN: ICD-10-CM

## 2021-01-25 DIAGNOSIS — Z01.419 ENCOUNTER FOR ANNUAL ROUTINE GYNECOLOGICAL EXAMINATION: Primary | ICD-10-CM

## 2021-01-25 PROCEDURE — 3008F BODY MASS INDEX DOCD: CPT | Performed by: OBSTETRICS & GYNECOLOGY

## 2021-01-25 PROCEDURE — 99395 PREV VISIT EST AGE 18-39: CPT | Performed by: OBSTETRICS & GYNECOLOGY

## 2021-01-25 PROCEDURE — 88175 CYTOPATH C/V AUTO FLUID REDO: CPT | Performed by: OBSTETRICS & GYNECOLOGY

## 2021-01-25 PROCEDURE — 3078F DIAST BP <80 MM HG: CPT | Performed by: OBSTETRICS & GYNECOLOGY

## 2021-01-25 PROCEDURE — 99213 OFFICE O/P EST LOW 20 MIN: CPT | Performed by: OBSTETRICS & GYNECOLOGY

## 2021-01-25 PROCEDURE — 3074F SYST BP LT 130 MM HG: CPT | Performed by: OBSTETRICS & GYNECOLOGY

## 2021-01-25 RX ORDER — NORGESTIMATE AND ETHINYL ESTRADIOL 7DAYSX3 28
1 KIT ORAL DAILY
Qty: 3 PACKAGE | Refills: 0 | Status: SHIPPED | OUTPATIENT
Start: 2021-01-25 | End: 2021-02-22

## 2021-01-25 NOTE — PROGRESS NOTES
Pt here for pe/pap. LMP:about 2 months ago  Last pap:5/6/19 negative   Last mammogram;N/A  Birth control:None not currently sexually active    Pt would like to discuss blood work re: hormone levels.   She states she is eating less and exercising and not lo

## 2021-01-25 NOTE — PROGRESS NOTES
HPI:   Rigoberto Corado is a 30year old  who presents for an annual gynecological exam.      Menses: Patient's last menstrual period was 2020 (approximate). Cycle length: 3 times per year. Patient has multiple issues.   She is currently not sex SURGICAL HISTORY      ihsan surgery d/t blood pooling in bladder      Family History   Adopted: Yes   Family history unknown: Yes      Social History:   Social History    Tobacco Use      Smoking status: Former Smoker        Packs/day: 0.50        Years: 5 no hernia  EXTREMITIES: No edema  EXTERNAL GENITALIA: Normal appearance for age, no lesions, normal hair distribution for age  URETHRA: No masses or tenderness, no prolapse  VAGINA: Normal, physiologic discharge, no lesions   CERVIX: Normal, no lesions, no review of results and charting    OCP (oral contraceptive pills) initiation; prescription placed for TriNessa. Patient instructed to start first coming Sunday. Not sexually active. Secondary oligomenorrhea: Most likely due to PCOS:     Hirsutism:  This

## 2021-01-29 NOTE — PROGRESS NOTES
Leslie Granger,    Your pap smear was normal/negative. Please contact the office if you have any questions or concerns.     Jody HENRY

## 2021-05-11 PROBLEM — M20.40 HAMMERTOE: Status: ACTIVE | Noted: 2019-03-07

## 2022-02-03 PROBLEM — J35.01 CHRONIC TONSILLITIS: Status: ACTIVE | Noted: 2022-02-03

## 2022-02-03 PROBLEM — J35.8 TONSIL STONE: Status: ACTIVE | Noted: 2022-02-03

## 2022-03-18 PROCEDURE — 88304 TISSUE EXAM BY PATHOLOGIST: CPT | Performed by: OTOLARYNGOLOGY

## (undated) NOTE — MR AVS SNAPSHOT
After Visit Summary   1/25/2021    Charmaine Gotti    MRN: [id number]           Visit Information     Date & Time  1/25/2021 11:30 AM Provider  Jesika Griffith MD Department  [place] [address], [facility] Dept.  Phone  [phone]      Your V 4/12/2021 10:00 AM [facility], Suzi Ashby      Imaging Scheduling Instructions     Around January 25, 2021   Imaging:   US TRANSVAG/ABDOMINAL EMG ONLY                    [facility] now offers Video Visits through [address] for adult and $70*       [address]   Monday – Friday  10:00 am – 10:00 pm   Saturday – Sunday  10:00 am – 4:00 pm     [place]   Monday – Friday  4:00 pm – 10:00 pm   Saturday – Sunday